# Patient Record
Sex: FEMALE | Race: WHITE | NOT HISPANIC OR LATINO
[De-identification: names, ages, dates, MRNs, and addresses within clinical notes are randomized per-mention and may not be internally consistent; named-entity substitution may affect disease eponyms.]

---

## 2019-06-19 ENCOUNTER — APPOINTMENT (OUTPATIENT)
Dept: ENDOCRINOLOGY | Facility: CLINIC | Age: 29
End: 2019-06-19
Payer: COMMERCIAL

## 2019-06-19 VITALS
DIASTOLIC BLOOD PRESSURE: 90 MMHG | HEIGHT: 61 IN | OXYGEN SATURATION: 99 % | BODY MASS INDEX: 27.38 KG/M2 | WEIGHT: 145 LBS | SYSTOLIC BLOOD PRESSURE: 148 MMHG | TEMPERATURE: 99 F | HEART RATE: 86 BPM

## 2019-06-19 VITALS — SYSTOLIC BLOOD PRESSURE: 138 MMHG | DIASTOLIC BLOOD PRESSURE: 86 MMHG

## 2019-06-19 DIAGNOSIS — Z83.49 FAMILY HISTORY OF OTHER ENDOCRINE, NUTRITIONAL AND METABOLIC DISEASES: ICD-10-CM

## 2019-06-19 PROCEDURE — 76536 US EXAM OF HEAD AND NECK: CPT

## 2019-06-19 PROCEDURE — 99214 OFFICE O/P EST MOD 30 MIN: CPT

## 2019-06-19 NOTE — PROCEDURE
[Voovio aka 3Ditize e 2008 model, 10-12 MHz frequencies] : multiple real time longitudinal and transverse images were obtained using a high resolution ultrasound with a linear transducer, Voovio aka 3Ditize e 2008 model, 10-12 MHz frequencies. All measurements will be reported as longitudinal x preston-posterior x transverse. [Multinodular Goiter] : multinodular goiter [Left Thyroid] : left [] : a heterogeneous parenchyma  [Upper] : upper pole there is a  [Solid] : solid [Heterogeneous] : heterogenous nodule [Round] : round in shape [Regular] : regular [No] : does not have a halo [No calcification] : no calcification [No vascularity] : no vascularity [FreeTextEntry1] : 2.26 x 1.78 x 1.87 [FreeTextEntry5] : 3.03 x 2.01 x 1.34 [FreeTextEntry3] : 0.38 x 0.32 x 0.32 [FreeTextEntry2] : 0.31

## 2019-06-19 NOTE — IMPRESSION
[FreeTextEntry1] : Heterogenous thyroid bilaterally with sub-centimeter nodularity as outlined above. The nodularity is non-high risk per Tirads criteria and thus no intervention is needed at this time. I have asked the patient to follow up in 6-9 months for thyroid reassessment and repeat thyroid ultrasound.\par

## 2019-06-19 NOTE — HISTORY OF PRESENT ILLNESS
[FreeTextEntry1] : Ms. ERIN AWAD   is a 28 year  year old  female who returns today in follow up with regard to a history of hypothyroidism.  She  is currently taking Levothyroxine 50   mcg daily. She She has been compliant in taking the LT4 daily, away from food or any medication that may inhibit absorption. She  has tolerated this medication well. Without any apparent adverse effects.  She denies any temperature intolerance, significant weight changes, or severe fatigue. She  in addition denies any palpitations, tremors, anxiousness, change in bowel habits or significant change in moods. . Latest tft's in april were wnl on the 50 mcg.\par Additional medical history includes \par Sh eis on ocp and menses have been light.  Was anemic in past. Now Hb was 13\par Thyroid was heterogeous last yr\par \par

## 2019-06-19 NOTE — PROCEDURE
[Linea e 2008 model, 10-12 MHz frequencies] : multiple real time longitudinal and transverse images were obtained using a high resolution ultrasound with a linear transducer, Linea e 2008 model, 10-12 MHz frequencies. All measurements will be reported as longitudinal x preston-posterior x transverse. [Multinodular Goiter] : multinodular goiter [] : a heterogeneous parenchyma  [Left Thyroid] : left [Upper] : upper pole there is a  [Solid] : solid [Heterogeneous] : heterogenous nodule [Regular] : regular [Round] : round in shape [No] : does not have a halo [No calcification] : no calcification [No vascularity] : no vascularity [FreeTextEntry5] : 3.03 x 2.01 x 1.34 [FreeTextEntry1] : 2.26 x 1.78 x 1.87 [FreeTextEntry3] : 0.38 x 0.32 x 0.32 [FreeTextEntry2] : 0.31

## 2019-06-19 NOTE — PHYSICAL EXAM
[Alert] : alert [No Acute Distress] : no acute distress [Well Nourished] : well nourished [Well Developed] : well developed [Normal Sclera/Conjunctiva] : normal sclera/conjunctiva [EOMI] : extra ocular movement intact [No Proptosis] : no proptosis [Normal Oropharynx] : the oropharynx was normal [No Thyroid Nodules] : there were no palpable thyroid nodules [Thyroid Not Enlarged] : the thyroid was not enlarged [No Respiratory Distress] : no respiratory distress [No Accessory Muscle Use] : no accessory muscle use [Clear to Auscultation] : lungs were clear to auscultation bilaterally [Normal Rate] : heart rate was normal  [Normal S1, S2] : normal S1 and S2 [Pedal Pulses Normal] : the pedal pulses are present [Regular Rhythm] : with a regular rhythm [No Edema] : there was no peripheral edema [Normal Bowel Sounds] : normal bowel sounds [Soft] : abdomen soft [Not Tender] : non-tender [Post Cervical Nodes] : posterior cervical nodes [Not Distended] : not distended [Anterior Cervical Nodes] : anterior cervical nodes [Axillary Nodes] : axillary nodes [Normal] : normal and non tender [No Spinal Tenderness] : no spinal tenderness [Spine Straight] : spine straight [No Stigmata of Cushings Syndrome] : no stigmata of cushings syndrome [Normal Gait] : normal gait [No Rash] : no rash [Normal Strength/Tone] : muscle strength and tone were normal [Normal Reflexes] : deep tendon reflexes were 2+ and symmetric [No Tremors] : no tremors [Oriented x3] : oriented to person, place, and time [de-identified] : Mary full and heterogenous [Acanthosis Nigricans] : no acanthosis nigricans

## 2019-06-19 NOTE — PROCEDURE
[Engage Mobility e 2008 model, 10-12 MHz frequencies] : multiple real time longitudinal and transverse images were obtained using a high resolution ultrasound with a linear transducer, Engage Mobility e 2008 model, 10-12 MHz frequencies. All measurements will be reported as longitudinal x preston-posterior x transverse. [Multinodular Goiter] : multinodular goiter [] : a heterogeneous parenchyma  [Left Thyroid] : left [Upper] : upper pole there is a  [Solid] : solid [Heterogeneous] : heterogenous nodule [Regular] : regular [Round] : round in shape [No] : does not have a halo [No calcification] : no calcification [No vascularity] : no vascularity [FreeTextEntry1] : 2.26 x 1.78 x 1.87 [FreeTextEntry5] : 3.03 x 2.01 x 1.34 [FreeTextEntry3] : 0.38 x 0.32 x 0.32 [FreeTextEntry2] : 0.31

## 2019-06-19 NOTE — PHYSICAL EXAM
[Alert] : alert [No Acute Distress] : no acute distress [Well Nourished] : well nourished [Normal Sclera/Conjunctiva] : normal sclera/conjunctiva [Well Developed] : well developed [No Proptosis] : no proptosis [EOMI] : extra ocular movement intact [Normal Oropharynx] : the oropharynx was normal [Thyroid Not Enlarged] : the thyroid was not enlarged [No Thyroid Nodules] : there were no palpable thyroid nodules [No Respiratory Distress] : no respiratory distress [No Accessory Muscle Use] : no accessory muscle use [Normal Rate] : heart rate was normal  [Clear to Auscultation] : lungs were clear to auscultation bilaterally [Pedal Pulses Normal] : the pedal pulses are present [Normal S1, S2] : normal S1 and S2 [Regular Rhythm] : with a regular rhythm [No Edema] : there was no peripheral edema [Normal Bowel Sounds] : normal bowel sounds [Not Tender] : non-tender [Soft] : abdomen soft [Not Distended] : not distended [Post Cervical Nodes] : posterior cervical nodes [Anterior Cervical Nodes] : anterior cervical nodes [Axillary Nodes] : axillary nodes [Normal] : normal and non tender [No Spinal Tenderness] : no spinal tenderness [Spine Straight] : spine straight [No Stigmata of Cushings Syndrome] : no stigmata of cushings syndrome [Normal Gait] : normal gait [Normal Strength/Tone] : muscle strength and tone were normal [No Rash] : no rash [No Tremors] : no tremors [Normal Reflexes] : deep tendon reflexes were 2+ and symmetric [Oriented x3] : oriented to person, place, and time [de-identified] : Mary full and heterogenous [Acanthosis Nigricans] : no acanthosis nigricans

## 2019-06-19 NOTE — PHYSICAL EXAM
[Alert] : alert [No Acute Distress] : no acute distress [Well Nourished] : well nourished [Well Developed] : well developed [Normal Sclera/Conjunctiva] : normal sclera/conjunctiva [EOMI] : extra ocular movement intact [Normal Oropharynx] : the oropharynx was normal [No Proptosis] : no proptosis [No Thyroid Nodules] : there were no palpable thyroid nodules [Thyroid Not Enlarged] : the thyroid was not enlarged [No Respiratory Distress] : no respiratory distress [No Accessory Muscle Use] : no accessory muscle use [Normal Rate] : heart rate was normal  [Clear to Auscultation] : lungs were clear to auscultation bilaterally [Normal S1, S2] : normal S1 and S2 [Pedal Pulses Normal] : the pedal pulses are present [Regular Rhythm] : with a regular rhythm [No Edema] : there was no peripheral edema [Normal Bowel Sounds] : normal bowel sounds [Not Tender] : non-tender [Soft] : abdomen soft [Post Cervical Nodes] : posterior cervical nodes [Not Distended] : not distended [Anterior Cervical Nodes] : anterior cervical nodes [Axillary Nodes] : axillary nodes [Normal] : normal and non tender [No Spinal Tenderness] : no spinal tenderness [Spine Straight] : spine straight [No Stigmata of Cushings Syndrome] : no stigmata of cushings syndrome [Normal Gait] : normal gait [Normal Strength/Tone] : muscle strength and tone were normal [No Rash] : no rash [No Tremors] : no tremors [Normal Reflexes] : deep tendon reflexes were 2+ and symmetric [Oriented x3] : oriented to person, place, and time [Acanthosis Nigricans] : no acanthosis nigricans [de-identified] : Mary full and heterogenous

## 2019-07-09 ENCOUNTER — RX RENEWAL (OUTPATIENT)
Age: 29
End: 2019-07-09

## 2019-12-24 ENCOUNTER — RX RENEWAL (OUTPATIENT)
Age: 29
End: 2019-12-24

## 2020-01-15 ENCOUNTER — APPOINTMENT (OUTPATIENT)
Dept: ENDOCRINOLOGY | Facility: CLINIC | Age: 30
End: 2020-01-15
Payer: COMMERCIAL

## 2020-01-15 VITALS
HEART RATE: 103 BPM | HEIGHT: 61 IN | WEIGHT: 153 LBS | OXYGEN SATURATION: 96 % | SYSTOLIC BLOOD PRESSURE: 174 MMHG | DIASTOLIC BLOOD PRESSURE: 110 MMHG | BODY MASS INDEX: 28.89 KG/M2

## 2020-01-15 VITALS — DIASTOLIC BLOOD PRESSURE: 92 MMHG | SYSTOLIC BLOOD PRESSURE: 142 MMHG

## 2020-01-15 PROCEDURE — 99214 OFFICE O/P EST MOD 30 MIN: CPT

## 2020-02-01 NOTE — HISTORY OF PRESENT ILLNESS
[FreeTextEntry1] : Ms. ERIN AWAD   is a 28 year  year old  female who returns today in follow up with regard to a history of hypothyroidism.  She  is currently taking Levothyroxine 50   mcg daily. She She has been compliant in taking the LT4 daily, away from food or any medication that may inhibit absorption. She  has tolerated this medication well. Without any apparent adverse effects.  She denies any temperature intolerance, significant weight changes, or severe fatigue. She  in addition denies any palpitations, tremors, anxiousness, change in bowel habits or significant change in moods. . Latest tft's in april were wnl on the 50 mcg.\par She is on ocp and menses have been light.  Was anemic in past. Now Hb was 13\par Thyroid was heterogenous last yr with thyroid US from June showing subcm nodularity\par Does have hx of intermittetn bp elevataion over a number of years.  Hesham states only up in dr's office as she darius checked elsewhere\par \par \par

## 2020-02-01 NOTE — PHYSICAL EXAM
[Alert] : alert [No Acute Distress] : no acute distress [Well Nourished] : well nourished [Normal Sclera/Conjunctiva] : normal sclera/conjunctiva [Well Developed] : well developed [EOMI] : extra ocular movement intact [No Proptosis] : no proptosis [Normal Oropharynx] : the oropharynx was normal [Thyroid Not Enlarged] : the thyroid was not enlarged [No Thyroid Nodules] : there were no palpable thyroid nodules [No Respiratory Distress] : no respiratory distress [No Accessory Muscle Use] : no accessory muscle use [Clear to Auscultation] : lungs were clear to auscultation bilaterally [Normal Rate] : heart rate was normal  [Normal S1, S2] : normal S1 and S2 [Regular Rhythm] : with a regular rhythm [Pedal Pulses Normal] : the pedal pulses are present [No Edema] : there was no peripheral edema [Normal Bowel Sounds] : normal bowel sounds [Not Tender] : non-tender [Not Distended] : not distended [Soft] : abdomen soft [Post Cervical Nodes] : posterior cervical nodes [Anterior Cervical Nodes] : anterior cervical nodes [Axillary Nodes] : axillary nodes [Normal] : normal and non tender [No Spinal Tenderness] : no spinal tenderness [Spine Straight] : spine straight [No Stigmata of Cushings Syndrome] : no stigmata of cushings syndrome [Normal Gait] : normal gait [Normal Strength/Tone] : muscle strength and tone were normal [No Rash] : no rash [Normal Reflexes] : deep tendon reflexes were 2+ and symmetric [Oriented x3] : oriented to person, place, and time [No Tremors] : no tremors [Acanthosis Nigricans] : no acanthosis nigricans

## 2020-03-16 ENCOUNTER — APPOINTMENT (OUTPATIENT)
Dept: PULMONOLOGY | Facility: CLINIC | Age: 30
End: 2020-03-16
Payer: COMMERCIAL

## 2020-03-16 VITALS
SYSTOLIC BLOOD PRESSURE: 160 MMHG | HEART RATE: 100 BPM | DIASTOLIC BLOOD PRESSURE: 100 MMHG | OXYGEN SATURATION: 98 % | HEIGHT: 61 IN | BODY MASS INDEX: 27 KG/M2 | TEMPERATURE: 98.3 F | WEIGHT: 143 LBS

## 2020-03-16 PROCEDURE — 99205 OFFICE O/P NEW HI 60 MIN: CPT | Mod: 25

## 2020-03-16 PROCEDURE — 88738 HGB QUANT TRANSCUTANEOUS: CPT

## 2020-03-16 PROCEDURE — 94729 DIFFUSING CAPACITY: CPT

## 2020-03-16 PROCEDURE — 95012 NITRIC OXIDE EXP GAS DETER: CPT

## 2020-03-16 PROCEDURE — 94060 EVALUATION OF WHEEZING: CPT

## 2020-03-16 PROCEDURE — 94727 GAS DIL/WSHOT DETER LNG VOL: CPT

## 2020-03-16 NOTE — PROCEDURE
[FreeTextEntry1] : Pulmonary function test: Lung volume was within normal limits with air-trapping; spirometry was within normal limits with significant improvement postbronchodilator, consistent with a reversible bronchospastic disease (asthma); diffusion was within normal limits. SpO2 at rest in room air was 98%

## 2020-03-16 NOTE — ASSESSMENT
[FreeTextEntry1] : I am starting her on Symbicort 160/4.5 mcg HFA, twice a day.\par I advised her to use ProAir HFA p.r.n.\par

## 2020-03-16 NOTE — HISTORY OF PRESENT ILLNESS
[TextBox_4] : Yinka is a pleasant 29-year-old female with history of mild bronchitis, she came in complaining of chest tightness and coughing for the last 3 days, no shortness of breath, fever or chills.

## 2020-03-18 LAB — POCT - HEMOGLOBIN (HGB), QUANTITATIVE, TRANSCUTANEOUS: 12.3

## 2020-04-01 ENCOUNTER — APPOINTMENT (OUTPATIENT)
Dept: PULMONOLOGY | Facility: CLINIC | Age: 30
End: 2020-04-01
Payer: COMMERCIAL

## 2020-04-01 VITALS
TEMPERATURE: 98.2 F | DIASTOLIC BLOOD PRESSURE: 139 MMHG | SYSTOLIC BLOOD PRESSURE: 167 MMHG | OXYGEN SATURATION: 99 % | RESPIRATION RATE: 16 BRPM | HEART RATE: 112 BPM

## 2020-04-01 VITALS — DIASTOLIC BLOOD PRESSURE: 90 MMHG | SYSTOLIC BLOOD PRESSURE: 158 MMHG

## 2020-04-01 VITALS — DIASTOLIC BLOOD PRESSURE: 139 MMHG | SYSTOLIC BLOOD PRESSURE: 167 MMHG

## 2020-04-01 PROCEDURE — 99214 OFFICE O/P EST MOD 30 MIN: CPT

## 2020-04-01 RX ORDER — FLUTICASONE PROPIONATE 50 UG/1
50 SPRAY, METERED NASAL
Qty: 1 | Refills: 3 | Status: ACTIVE | COMMUNITY
Start: 2020-04-01 | End: 1900-01-01

## 2020-04-01 NOTE — REASON FOR VISIT
[Follow-Up] : a follow-up visit [Asthma] : asthma [Cough] : cough [TextBox_44] : cough and PND, no fever/chills

## 2020-04-15 ENCOUNTER — APPOINTMENT (OUTPATIENT)
Dept: PULMONOLOGY | Facility: CLINIC | Age: 30
End: 2020-04-15

## 2020-04-29 ENCOUNTER — APPOINTMENT (OUTPATIENT)
Dept: PULMONOLOGY | Facility: CLINIC | Age: 30
End: 2020-04-29
Payer: COMMERCIAL

## 2020-04-29 VITALS
SYSTOLIC BLOOD PRESSURE: 170 MMHG | TEMPERATURE: 98.4 F | HEART RATE: 92 BPM | DIASTOLIC BLOOD PRESSURE: 120 MMHG | OXYGEN SATURATION: 100 %

## 2020-04-29 PROCEDURE — 99214 OFFICE O/P EST MOD 30 MIN: CPT

## 2020-04-30 NOTE — PHYSICAL EXAM
[No Acute Distress] : no acute distress [Normal Oropharynx] : normal oropharynx [Normal Appearance] : normal appearance [Normal S1, S2] : normal s1, s2 [No Neck Mass] : no neck mass [Normal Rate/Rhythm] : normal rate/rhythm [No Resp Distress] : no resp distress [No Murmurs] : no murmurs [No Abnormalities] : no abnormalities [Clear to Auscultation Bilaterally] : clear to auscultation bilaterally [Benign] : benign [Normal Gait] : normal gait [No Clubbing] : no clubbing [No Cyanosis] : no cyanosis [No Edema] : no edema [FROM] : FROM [Normal Color/ Pigmentation] : normal color/ pigmentation [No Focal Deficits] : no focal deficits [Oriented x3] : oriented x3 [Normal Affect] : normal affect

## 2020-06-05 ENCOUNTER — RX RENEWAL (OUTPATIENT)
Age: 30
End: 2020-06-05

## 2020-06-19 ENCOUNTER — RX RENEWAL (OUTPATIENT)
Age: 30
End: 2020-06-19

## 2020-06-24 ENCOUNTER — APPOINTMENT (OUTPATIENT)
Dept: PULMONOLOGY | Facility: CLINIC | Age: 30
End: 2020-06-24
Payer: COMMERCIAL

## 2020-06-24 VITALS
TEMPERATURE: 98.4 F | HEART RATE: 115 BPM | SYSTOLIC BLOOD PRESSURE: 200 MMHG | DIASTOLIC BLOOD PRESSURE: 123 MMHG | OXYGEN SATURATION: 100 %

## 2020-06-24 VITALS — DIASTOLIC BLOOD PRESSURE: 90 MMHG | SYSTOLIC BLOOD PRESSURE: 140 MMHG

## 2020-06-24 PROCEDURE — 99214 OFFICE O/P EST MOD 30 MIN: CPT

## 2020-06-24 NOTE — PHYSICAL EXAM
[No Acute Distress] : no acute distress [Normal Appearance] : normal appearance [Normal Oropharynx] : normal oropharynx [No Neck Mass] : no neck mass [No Murmurs] : no murmurs [Normal Rate/Rhythm] : normal rate/rhythm [Normal S1, S2] : normal s1, s2 [No Abnormalities] : no abnormalities [Clear to Auscultation Bilaterally] : clear to auscultation bilaterally [No Resp Distress] : no resp distress [Normal Gait] : normal gait [No Clubbing] : no clubbing [Benign] : benign [FROM] : FROM [No Edema] : no edema [No Cyanosis] : no cyanosis [Normal Color/ Pigmentation] : normal color/ pigmentation [No Focal Deficits] : no focal deficits [Oriented x3] : oriented x3 [Normal Affect] : normal affect

## 2020-06-26 NOTE — HISTORY OF PRESENT ILLNESS
[TextBox_4] : Yinka came to clinic today offered no cc she is here for f/u 2/2 asthma excerebration that has resolved . \par adherent to her current medications.\par BP was elevated 2/2 anxiety this resolved and her BP normalized .

## 2020-07-15 ENCOUNTER — APPOINTMENT (OUTPATIENT)
Dept: ENDOCRINOLOGY | Facility: CLINIC | Age: 30
End: 2020-07-15

## 2020-07-16 ENCOUNTER — APPOINTMENT (OUTPATIENT)
Dept: ENDOCRINOLOGY | Facility: CLINIC | Age: 30
End: 2020-07-16
Payer: COMMERCIAL

## 2020-07-16 DIAGNOSIS — R03.0 ELEVATED BLOOD-PRESSURE READING, W/OUT DIAGNOSIS OF HYPERTENSION: ICD-10-CM

## 2020-07-16 DIAGNOSIS — E04.1 NONTOXIC SINGLE THYROID NODULE: ICD-10-CM

## 2020-07-16 PROCEDURE — 99214 OFFICE O/P EST MOD 30 MIN: CPT | Mod: 95

## 2020-07-16 RX ORDER — LEVOTHYROXINE SODIUM 0.05 MG/1
50 TABLET ORAL
Qty: 90 | Refills: 1 | Status: ACTIVE | COMMUNITY
Start: 2019-07-09 | End: 1900-01-01

## 2020-08-07 PROBLEM — R03.0 BLOOD PRESSURE ELEVATED WITHOUT HISTORY OF HTN: Status: ACTIVE | Noted: 2019-06-19

## 2020-08-07 PROBLEM — E04.1 NODULAR THYROID DISEASE: Status: ACTIVE | Noted: 2019-06-19

## 2020-08-07 NOTE — PHYSICAL EXAM
[Well Nourished] : well nourished [Alert] : alert [No Acute Distress] : no acute distress [Oriented x3] : oriented to person, place, and time [Normal Insight/Judgement] : insight and judgment were intact [Normal Affect] : the affect was normal [Normal Mood] : the mood was normal Normal

## 2020-08-07 NOTE — HISTORY OF PRESENT ILLNESS
[Medical Office: ([facility])___] : at the medical office located in  [Verbal consent obtained from patient] : the patient, [unfilled] [FreeTextEntry1] : Ms. ERIN AWAD   is a 28 year  year old  female who returns today in follow up via telehealth with regard to a history of hypothyroidism.  She  is currently taking Levothyroxine 50   mcg daily.  She has been compliant in taking the LT4 daily, away from food or any medication that may inhibit absorption. She  has tolerated this medication well. Without any apparent adverse effects.  She denies any temperature intolerance, significant weight changes, or severe fatigue. She  in addition denies any palpitations, tremors, anxiousness, change in bowel habits or significant change in moods. . Latest tft's in april were wnl on the 50 mcg.\par Had fungal infection ? bronchi -follows with pulm re asthma/bronchitis\par She is on ocp and menses have been light.  Was anemic in past. Now Hb was 13\par Thyroid was heterogenous last yr with thyroid US from June showing subcm nodularity\par Does have hx of intermittent bp elevation over a number of years.  Patient states only up in dr's office as she darius checked elsewhere\par On Ocp with iron. Latest iron 104 but ferritin still low end at 16 / OCP is [facility]\par On D3  2,000 iu daily with level-told to incr to 3,000 iu\par \par \par

## 2020-09-10 ENCOUNTER — RX RENEWAL (OUTPATIENT)
Age: 30
End: 2020-09-10

## 2021-03-12 ENCOUNTER — APPOINTMENT (OUTPATIENT)
Dept: PULMONOLOGY | Facility: CLINIC | Age: 31
End: 2021-03-12
Payer: COMMERCIAL

## 2021-03-12 VITALS
SYSTOLIC BLOOD PRESSURE: 148 MMHG | OXYGEN SATURATION: 100 % | TEMPERATURE: 98.7 F | HEART RATE: 93 BPM | DIASTOLIC BLOOD PRESSURE: 85 MMHG

## 2021-03-12 PROCEDURE — 99214 OFFICE O/P EST MOD 30 MIN: CPT

## 2021-03-12 PROCEDURE — 99072 ADDL SUPL MATRL&STAF TM PHE: CPT

## 2021-03-26 ENCOUNTER — TRANSCRIPTION ENCOUNTER (OUTPATIENT)
Age: 31
End: 2021-03-26

## 2021-04-02 ENCOUNTER — NON-APPOINTMENT (OUTPATIENT)
Age: 31
End: 2021-04-02

## 2021-05-04 ENCOUNTER — APPOINTMENT (OUTPATIENT)
Dept: DISASTER EMERGENCY | Facility: CLINIC | Age: 31
End: 2021-05-04

## 2021-05-07 ENCOUNTER — APPOINTMENT (OUTPATIENT)
Dept: PULMONOLOGY | Facility: CLINIC | Age: 31
End: 2021-05-07
Payer: COMMERCIAL

## 2021-05-07 VITALS — HEART RATE: 92 BPM | DIASTOLIC BLOOD PRESSURE: 90 MMHG | SYSTOLIC BLOOD PRESSURE: 180 MMHG | OXYGEN SATURATION: 100 %

## 2021-05-07 PROCEDURE — 99072 ADDL SUPL MATRL&STAF TM PHE: CPT

## 2021-05-07 PROCEDURE — 94729 DIFFUSING CAPACITY: CPT

## 2021-05-07 PROCEDURE — ZZZZZ: CPT

## 2021-05-07 PROCEDURE — 88738 HGB QUANT TRANSCUTANEOUS: CPT

## 2021-05-07 PROCEDURE — 94010 BREATHING CAPACITY TEST: CPT

## 2021-05-07 PROCEDURE — 95012 NITRIC OXIDE EXP GAS DETER: CPT

## 2021-05-07 PROCEDURE — 94727 GAS DIL/WSHOT DETER LNG VOL: CPT

## 2021-05-07 PROCEDURE — 99214 OFFICE O/P EST MOD 30 MIN: CPT | Mod: 25

## 2021-05-09 NOTE — HISTORY OF PRESENT ILLNESS
[TextBox_4] : Here for PFT\par Symbicort inhaler daily\par States overall OK\par BP elevated in office, states "being managed" by Cardiologist \par

## 2021-05-09 NOTE — PROCEDURE
[FreeTextEntry1] : Pulmonary Function Test: Lung Volume: Within normal limits; Spirometry: Within normal limits; Diffusion: Within normal limits.\par \par  Exhaled Nitric Oxide             Final\par \par No Documents Attached\par \par \par   Test   Result   Flag Reference Goal Last Verified \par   Exhaled Nitric Oxide 14      REQUIRED \par \par  Ordered by: JOLEEN LYONS       Collected/Examined: 07May2021 02:48PM       \par Verification Required       Stage: Final       \par  Performed at: Other       Performed by: JOLEEN LYONS       Resulted: 07May2021 02:48PM       Last Updated: 07May2021 02:48PM       \par

## 2021-05-19 ENCOUNTER — APPOINTMENT (OUTPATIENT)
Dept: ENDOCRINOLOGY | Facility: CLINIC | Age: 31
End: 2021-05-19

## 2021-05-24 LAB — SARS-COV-2 N GENE NPH QL NAA+PROBE: NOT DETECTED

## 2021-06-07 ENCOUNTER — RX RENEWAL (OUTPATIENT)
Age: 31
End: 2021-06-07

## 2021-08-20 DIAGNOSIS — Z20.822 CONTACT WITH AND (SUSPECTED) EXPOSURE TO COVID-19: ICD-10-CM

## 2021-09-17 DIAGNOSIS — Z01.818 ENCOUNTER FOR OTHER PREPROCEDURAL EXAMINATION: ICD-10-CM

## 2021-09-18 ENCOUNTER — APPOINTMENT (OUTPATIENT)
Dept: DISASTER EMERGENCY | Facility: CLINIC | Age: 31
End: 2021-09-18

## 2021-09-19 LAB — SARS-COV-2 N GENE NPH QL NAA+PROBE: NOT DETECTED

## 2021-09-22 ENCOUNTER — APPOINTMENT (OUTPATIENT)
Dept: PULMONOLOGY | Facility: CLINIC | Age: 31
End: 2021-09-22
Payer: COMMERCIAL

## 2021-09-22 VITALS
TEMPERATURE: 97.2 F | OXYGEN SATURATION: 97 % | DIASTOLIC BLOOD PRESSURE: 121 MMHG | HEART RATE: 104 BPM | WEIGHT: 132 LBS | SYSTOLIC BLOOD PRESSURE: 170 MMHG | HEIGHT: 61 IN | BODY MASS INDEX: 24.92 KG/M2 | RESPIRATION RATE: 16 BRPM

## 2021-09-22 DIAGNOSIS — Z23 ENCOUNTER FOR IMMUNIZATION: ICD-10-CM

## 2021-09-22 PROCEDURE — 88738 HGB QUANT TRANSCUTANEOUS: CPT

## 2021-09-22 PROCEDURE — 94729 DIFFUSING CAPACITY: CPT

## 2021-09-22 PROCEDURE — 90686 IIV4 VACC NO PRSV 0.5 ML IM: CPT

## 2021-09-22 PROCEDURE — 99214 OFFICE O/P EST MOD 30 MIN: CPT | Mod: 25

## 2021-09-22 PROCEDURE — 94010 BREATHING CAPACITY TEST: CPT

## 2021-09-22 PROCEDURE — G0008: CPT

## 2021-09-22 PROCEDURE — 94727 GAS DIL/WSHOT DETER LNG VOL: CPT

## 2021-09-22 PROCEDURE — ZZZZZ: CPT

## 2021-09-23 NOTE — PROCEDURE
[FreeTextEntry1] : Pulmonary Function Test: Lung Volume: Within normal limits; Spirometry: Within normal limits; Diffusion: Within normal limits.\par \par

## 2021-10-21 ENCOUNTER — APPOINTMENT (OUTPATIENT)
Dept: PULMONOLOGY | Facility: CLINIC | Age: 31
End: 2021-10-21
Payer: COMMERCIAL

## 2021-10-21 DIAGNOSIS — Z01.812 ENCOUNTER FOR PREPROCEDURAL LABORATORY EXAMINATION: ICD-10-CM

## 2021-10-21 PROCEDURE — 99214 OFFICE O/P EST MOD 30 MIN: CPT | Mod: 95

## 2021-10-21 RX ORDER — ALBUTEROL SULFATE 90 UG/1
108 (90 BASE) INHALANT RESPIRATORY (INHALATION)
Qty: 1 | Refills: 5 | Status: ACTIVE | COMMUNITY
Start: 2020-03-16 | End: 1900-01-01

## 2021-10-23 NOTE — HISTORY OF PRESENT ILLNESS
[TextBox_4] : This visit was provided via telehealth using real-time 2-way audio visual technology.  The patient, ERIN AWAD, was located at home, [address] [place] [address] 8\[place] [address] at the time of the visit.  \par The provider, Ophelia Tierney, was located at the office [address] [place] [address], [address] at the time of the visit. \par The patient, Ms. ERIN AWAD  and physician Ophelia Tierney DO, participated in the telehealth encounter.\par \par Verbal consent was obtained by the  from patient.\par

## 2021-10-23 NOTE — REASON FOR VISIT
[Follow-Up] : a follow-up visit [Asthma] : asthma [Cough] : cough [TextBox_44] : Cough is much better at this point

## 2021-10-23 NOTE — ASSESSMENT
[FreeTextEntry1] : Start xyzal\par Continue Singulair.\par Albuterol HFA as needed for shortness of breath.\par \par Time spent in telehealth consultation and charting is 40 minutes.

## 2021-12-18 LAB — SARS-COV-2 N GENE NPH QL NAA+PROBE: NOT DETECTED

## 2021-12-21 ENCOUNTER — APPOINTMENT (OUTPATIENT)
Dept: PULMONOLOGY | Facility: CLINIC | Age: 31
End: 2021-12-21
Payer: COMMERCIAL

## 2021-12-21 VITALS
BODY MASS INDEX: 26.83 KG/M2 | RESPIRATION RATE: 14 BRPM | SYSTOLIC BLOOD PRESSURE: 160 MMHG | TEMPERATURE: 98 F | DIASTOLIC BLOOD PRESSURE: 100 MMHG | HEART RATE: 80 BPM | OXYGEN SATURATION: 97 % | WEIGHT: 142 LBS

## 2021-12-21 PROCEDURE — 94727 GAS DIL/WSHOT DETER LNG VOL: CPT

## 2021-12-21 PROCEDURE — 99214 OFFICE O/P EST MOD 30 MIN: CPT | Mod: 25

## 2021-12-21 PROCEDURE — 94729 DIFFUSING CAPACITY: CPT

## 2021-12-21 PROCEDURE — 88738 HGB QUANT TRANSCUTANEOUS: CPT

## 2021-12-21 PROCEDURE — 94010 BREATHING CAPACITY TEST: CPT

## 2021-12-21 PROCEDURE — ZZZZZ: CPT

## 2021-12-21 NOTE — ASSESSMENT
[FreeTextEntry1] : Medications reviewed. Continue present medications.\par Continue Singulair.\par Albuterol HFA as needed for shortness of breath.\par Return for pulmonary follow-up in 3 months.\par

## 2021-12-21 NOTE — REASON FOR VISIT
[Follow-Up] : a follow-up visit [Asthma] : asthma [Cough] : cough [TextBox_44] : Cough is much better at this point. On Spironolactone for HTN.

## 2021-12-21 NOTE — HISTORY OF PRESENT ILLNESS
[TextBox_4] : This visit was provided via telehealth using real-time 2-way audio visual technology.  The patient, ERIN AWAD, was located at home, [address] [place] [address] 8\[place] [address] at the time of the visit.  \par The provider, Ophelia Tierney, was located at the office [address] [place] [address], [address] at the time of the visit. \par The patient, Ms. ERIN AWAD  and physician Ophelia Tierney DO, participated in the telehealth encounter.\par \par Verbal consent was obtained by the  from patient.\par

## 2021-12-21 NOTE — PROCEDURE
[FreeTextEntry1] : Pulmonary function test performed in my office today: Spirometry is within normal limits; lung volumes within normal limits; diffusion is within normal limits.

## 2022-03-23 ENCOUNTER — APPOINTMENT (OUTPATIENT)
Dept: PULMONOLOGY | Facility: CLINIC | Age: 32
End: 2022-03-23
Payer: COMMERCIAL

## 2022-03-23 VITALS — OXYGEN SATURATION: 96 % | HEART RATE: 99 BPM | SYSTOLIC BLOOD PRESSURE: 150 MMHG | DIASTOLIC BLOOD PRESSURE: 100 MMHG

## 2022-03-23 PROCEDURE — 99213 OFFICE O/P EST LOW 20 MIN: CPT | Mod: 95

## 2022-03-25 NOTE — HISTORY OF PRESENT ILLNESS
[TextBox_4] : This visit was provided via telehealth using real-time 2-way audio visual technology.  The patient, ERIN AWAD, was located at home, [address] [place] [address] 8\[place] [address] at the time of the visit.  \par The provider, Ophelia Tierney, was located at the office [address] [place] [address], [address] at the time of the visit. \par The patient, Ms. ERIN AWAD  and physician Ophelia Tierney DO, participated in the telehealth encounter.\par \par Verbal consent was obtained by the  from patient.\par

## 2022-03-25 NOTE — REASON FOR VISIT
[Follow-Up] : a follow-up visit [Asthma] : asthma [Cough] : cough [TextBox_44] : Cough is much better at this point. On Spironolactone for HTN., s/p negative home sleep study for MADELINE.

## 2022-06-07 ENCOUNTER — RX RENEWAL (OUTPATIENT)
Age: 32
End: 2022-06-07

## 2022-06-08 ENCOUNTER — APPOINTMENT (OUTPATIENT)
Dept: PULMONOLOGY | Facility: CLINIC | Age: 32
End: 2022-06-08
Payer: COMMERCIAL

## 2022-06-08 VITALS
BODY MASS INDEX: 26.81 KG/M2 | SYSTOLIC BLOOD PRESSURE: 126 MMHG | HEART RATE: 75 BPM | DIASTOLIC BLOOD PRESSURE: 72 MMHG | OXYGEN SATURATION: 96 % | TEMPERATURE: 97.7 F | WEIGHT: 142 LBS | HEIGHT: 61 IN

## 2022-06-08 VITALS — DIASTOLIC BLOOD PRESSURE: 72 MMHG | SYSTOLIC BLOOD PRESSURE: 136 MMHG

## 2022-06-08 PROCEDURE — 99214 OFFICE O/P EST MOD 30 MIN: CPT | Mod: 25

## 2022-06-08 PROCEDURE — 88738 HGB QUANT TRANSCUTANEOUS: CPT

## 2022-06-08 PROCEDURE — 94729 DIFFUSING CAPACITY: CPT

## 2022-06-08 PROCEDURE — ZZZZZ: CPT

## 2022-06-08 PROCEDURE — 94010 BREATHING CAPACITY TEST: CPT

## 2022-06-08 PROCEDURE — 94727 GAS DIL/WSHOT DETER LNG VOL: CPT

## 2022-06-08 NOTE — ASSESSMENT
[FreeTextEntry1] : Medications reviewed. Continue present medications.\par Continue Symbicort HFA for history of asthma.  \par Continue Singulair.\par Albuterol HFA as needed for shortness of breath.\par Continue hydrochlorothiazide for hypertension.  \par Continue levothyroxine for hypothyroidism.  \par Return for pulmonary follow-up in 3 months.\par

## 2022-06-08 NOTE — DISCUSSION/SUMMARY
[FreeTextEntry1] : Improved cough secondary to asthma and seasonal allergies.  Hypertension.  Hypothyroidism

## 2022-06-08 NOTE — HISTORY OF PRESENT ILLNESS
[TextBox_4] : This visit was provided via telehealth using real-time 2-way audio visual technology.  The patient, ERIN AWAD, was located at home, [address] [place] [address] 8\[place] [address] at the time of the visit.  \par The provider, Ophelia Tierney, was located at the office [address] [place] [address], [address] at the time of the visit. \par The patient, Ms. ERIN AWAD  and physician Ophelia Tierney DO, participated in the telehealth encounter.\par \par Verbal consent was obtained by the  from patient.\par

## 2022-06-12 LAB — POCT - HEMOGLOBIN (HGB), QUANTITATIVE, TRANSCUTANEOUS: 15.1

## 2022-09-07 ENCOUNTER — APPOINTMENT (OUTPATIENT)
Dept: PULMONOLOGY | Facility: CLINIC | Age: 32
End: 2022-09-07

## 2022-09-07 VITALS
BODY MASS INDEX: 26.81 KG/M2 | WEIGHT: 142 LBS | HEART RATE: 89 BPM | DIASTOLIC BLOOD PRESSURE: 84 MMHG | HEIGHT: 61 IN | SYSTOLIC BLOOD PRESSURE: 123 MMHG | RESPIRATION RATE: 16 BRPM | OXYGEN SATURATION: 96 % | TEMPERATURE: 98.3 F

## 2022-09-07 DIAGNOSIS — I10 ESSENTIAL (PRIMARY) HYPERTENSION: ICD-10-CM

## 2022-09-07 DIAGNOSIS — R09.82 POSTNASAL DRIP: ICD-10-CM

## 2022-09-07 PROCEDURE — 94727 GAS DIL/WSHOT DETER LNG VOL: CPT

## 2022-09-07 PROCEDURE — ZZZZZ: CPT

## 2022-09-07 PROCEDURE — 88738 HGB QUANT TRANSCUTANEOUS: CPT

## 2022-09-07 PROCEDURE — 95012 NITRIC OXIDE EXP GAS DETER: CPT

## 2022-09-07 PROCEDURE — 99214 OFFICE O/P EST MOD 30 MIN: CPT | Mod: 25

## 2022-09-07 PROCEDURE — 94010 BREATHING CAPACITY TEST: CPT

## 2022-09-07 PROCEDURE — 94729 DIFFUSING CAPACITY: CPT

## 2022-09-07 RX ORDER — LEVOCETIRIZINE DIHYDROCHLORIDE 5 MG/1
5 TABLET ORAL DAILY
Qty: 90 | Refills: 3 | Status: ACTIVE | COMMUNITY
Start: 2021-09-22

## 2022-09-07 RX ORDER — NORETHINDRONE ACETATE AND ETHINYL ESTRADIOL 1; .02 MG/1; MG/1
1-20 TABLET ORAL
Refills: 0 | Status: ACTIVE | COMMUNITY

## 2022-09-07 RX ORDER — BUDESONIDE AND FORMOTEROL FUMARATE DIHYDRATE 160; 4.5 UG/1; UG/1
160-4.5 AEROSOL RESPIRATORY (INHALATION) TWICE DAILY
Qty: 3 | Refills: 2 | Status: COMPLETED | COMMUNITY
Start: 2020-06-05 | End: 2022-09-07

## 2022-09-07 RX ORDER — HYDROCHLOROTHIAZIDE 25 MG/1
25 TABLET ORAL
Refills: 0 | Status: ACTIVE | COMMUNITY

## 2022-09-07 RX ORDER — VALSARTAN 40 MG/1
40 TABLET, COATED ORAL
Refills: 0 | Status: ACTIVE | COMMUNITY

## 2022-09-07 RX ORDER — SPIRONOLACTONE 50 MG/1
50 TABLET ORAL
Refills: 0 | Status: ACTIVE | COMMUNITY

## 2022-09-07 RX ORDER — SPIRONOLACTONE 100 MG/1
100 TABLET ORAL
Refills: 0 | Status: ACTIVE | COMMUNITY

## 2022-09-07 RX ORDER — BUDESONIDE AND FORMOTEROL FUMARATE DIHYDRATE 160; 4.5 UG/1; UG/1
160-4.5 AEROSOL RESPIRATORY (INHALATION) TWICE DAILY
Qty: 1 | Refills: 2 | Status: COMPLETED | COMMUNITY
Start: 2020-03-16 | End: 2022-09-07

## 2022-09-08 PROBLEM — I10 HYPERTENSION: Status: ACTIVE | Noted: 2021-12-21

## 2022-09-08 PROBLEM — R09.82 POSTNASAL DRIP: Status: ACTIVE | Noted: 2020-04-01

## 2022-09-08 NOTE — REASON FOR VISIT
[Follow-Up] : a follow-up visit [Asthma] : asthma [Shortness of Breath] : shortness of breath [TextBox_44] : Shortness of breath and cough have improved

## 2022-09-08 NOTE — PROCEDURE
[FreeTextEntry1] : Pulmonary Function Test performed in my office today: Spirometry: Within normal limits; Lung Volume: Within normal limits; Diffusion: Within normal limits.\par \par  Exhaled Nitric Oxide             Final\par \par No Documents Attached\par \par \par   Test   Result   Flag Reference Goal Last Verified \par   Exhaled Nitric Oxide 8      REQUIRED \par \par  Ordered by: JOLEEN LYONS       Collected/Examined: 07Sep2022 05:47PM       \par Verification Required       Stage: Final       \par  Performed at: In Office       Performed by: JOLEEN LYONS       Resulted: 07Sep2022 05:46PM       Last Updated: 07Sep2022 05:47PM       \par

## 2022-09-08 NOTE — ASSESSMENT
[FreeTextEntry1] : Medications reviewed. Continue present medications. \par Continue Singulair.\par Albuterol HFA as needed for shortness of breath.\par Continue hydrochlorothiazide for hypertension.  \par Continue levothyroxine for hypothyroidism.  \par Return for pulmonary follow-up in 3 months.\par

## 2022-12-26 ENCOUNTER — NON-APPOINTMENT (OUTPATIENT)
Age: 32
End: 2022-12-26

## 2022-12-27 ENCOUNTER — APPOINTMENT (OUTPATIENT)
Dept: PULMONOLOGY | Facility: CLINIC | Age: 32
End: 2022-12-27

## 2022-12-27 VITALS — SYSTOLIC BLOOD PRESSURE: 116 MMHG | DIASTOLIC BLOOD PRESSURE: 78 MMHG | HEART RATE: 70 BPM | OXYGEN SATURATION: 99 %

## 2022-12-27 DIAGNOSIS — R53.83 OTHER FATIGUE: ICD-10-CM

## 2022-12-27 PROCEDURE — 94727 GAS DIL/WSHOT DETER LNG VOL: CPT

## 2022-12-27 PROCEDURE — 94729 DIFFUSING CAPACITY: CPT

## 2022-12-27 PROCEDURE — ZZZZZ: CPT

## 2022-12-27 PROCEDURE — 95012 NITRIC OXIDE EXP GAS DETER: CPT

## 2022-12-27 PROCEDURE — 99213 OFFICE O/P EST LOW 20 MIN: CPT | Mod: 25

## 2022-12-27 PROCEDURE — 88738 HGB QUANT TRANSCUTANEOUS: CPT

## 2022-12-27 PROCEDURE — 94010 BREATHING CAPACITY TEST: CPT

## 2022-12-28 PROBLEM — R53.83 FATIGUE: Status: ACTIVE | Noted: 2020-01-15

## 2022-12-28 NOTE — HISTORY OF PRESENT ILLNESS
[TextBox_4] : ERIN AWAD is a 32 year female who presents to the office for follow up evaluation of COVID. She reports having COVID 3 weeks ago. Patient is overall feeling well; no complaints at this time. Patient mentions that she restarted exercising. Patient denies shortness of breath. She states that she still uses montelukast and albuterol as needed.

## 2022-12-28 NOTE — ASSESSMENT
[FreeTextEntry1] : Continue Singulair.  \par Albuterol HFA as needed for shortness of breath.  \par Renewed albuterol via nebulizer..\par Return for pulmonary follow up in 3 months.

## 2022-12-28 NOTE — PROCEDURE
[FreeTextEntry1] : Pulmonary Function Test obtained in office today which revealed: Spirometry: Within normal limits, Lung Volume: Within normal limits, Diffusion: Within normal limits. \par \par  Exhaled Nitric Oxide             Final\par \par No Documents Attached\par \par \par   Test   Result   Flag Reference Goal Last Verified \par   Exhaled Nitric Oxide 11      REQUIRED \par \par  Ordered by: JOLEEN LYONS       Collected/Examined: [id number] 12:42PM       \par Verification Required       Stage: Final       \par  Performed at: In Office       Performed by: JOLEEN LYONS       Resulted: [id number] 12:41PM       Last Updated: [id number] 12:42PM       \par

## 2023-03-22 ENCOUNTER — APPOINTMENT (OUTPATIENT)
Dept: PULMONOLOGY | Facility: CLINIC | Age: 33
End: 2023-03-22
Payer: COMMERCIAL

## 2023-03-22 VITALS
DIASTOLIC BLOOD PRESSURE: 83 MMHG | SYSTOLIC BLOOD PRESSURE: 123 MMHG | RESPIRATION RATE: 16 BRPM | HEART RATE: 90 BPM | OXYGEN SATURATION: 97 %

## 2023-03-22 DIAGNOSIS — R05.9 COUGH, UNSPECIFIED: ICD-10-CM

## 2023-03-22 DIAGNOSIS — J45.909 UNSPECIFIED ASTHMA, UNCOMPLICATED: ICD-10-CM

## 2023-03-22 DIAGNOSIS — E03.9 HYPOTHYROIDISM, UNSPECIFIED: ICD-10-CM

## 2023-03-22 PROCEDURE — 99213 OFFICE O/P EST LOW 20 MIN: CPT | Mod: 25

## 2023-03-22 PROCEDURE — 94727 GAS DIL/WSHOT DETER LNG VOL: CPT

## 2023-03-22 PROCEDURE — 94010 BREATHING CAPACITY TEST: CPT

## 2023-03-22 PROCEDURE — 95012 NITRIC OXIDE EXP GAS DETER: CPT

## 2023-03-22 PROCEDURE — 94729 DIFFUSING CAPACITY: CPT

## 2023-03-22 PROCEDURE — ZZZZZ: CPT

## 2023-03-22 RX ORDER — ALBUTEROL SULFATE 2.5 MG/3ML
(2.5 MG/3ML) SOLUTION RESPIRATORY (INHALATION)
Qty: 1 | Refills: 5 | Status: ACTIVE | COMMUNITY
Start: 2022-12-27 | End: 1900-01-01

## 2023-03-22 NOTE — ASSESSMENT
[FreeTextEntry1] : Continue Singulair.  \par Albuterol HFA as needed for shortness of breath.  \par Renewed albuterol via nebulizer as needed..\par Return for pulmonary follow up in 3 months.

## 2023-03-22 NOTE — PROCEDURE
[FreeTextEntry1] : Pulmonary Function Test performed in my office today: Spirometry: Within normal limits; Lung Volume: Within normal limits; Diffusion: Within normal limits.\par _________\par \par  Exhaled Nitric Oxide             Final\par \par No Documents Attached\par \par \par   Test   Result   Flag Reference Goal Last Verified \par   Exhaled Nitric Oxide 12      REQUIRED \par \par  Ordered by: JOLEEN LYONS       Collected/Examined: 22Mar2023 05:33PM       \par Verification Required       Stage: Final       \par  Performed at: In Office       Performed by: RUTHY NASH       Resulted: 22Mar2023 05:32PM       Last Updated: 22Mar2023 05:33PM       \par

## 2023-03-22 NOTE — HISTORY OF PRESENT ILLNESS
[TextBox_4] : Overall feeling well; no respiratory complaints reported at this time \par \par Not using inhalers, using Singulair and allergy medications

## 2023-05-23 ENCOUNTER — RX RENEWAL (OUTPATIENT)
Age: 33
End: 2023-05-23

## 2023-07-26 ENCOUNTER — APPOINTMENT (OUTPATIENT)
Dept: PULMONOLOGY | Facility: CLINIC | Age: 33
End: 2023-07-26

## 2024-05-10 ENCOUNTER — RX RENEWAL (OUTPATIENT)
Age: 34
End: 2024-05-10

## 2024-05-10 RX ORDER — MONTELUKAST 10 MG/1
10 TABLET, FILM COATED ORAL
Qty: 90 | Refills: 3 | Status: ACTIVE | COMMUNITY
Start: 2020-04-01 | End: 1900-01-01